# Patient Record
(demographics unavailable — no encounter records)

---

## 2024-10-15 NOTE — HEALTH RISK ASSESSMENT
[Very Good] : ~his/her~  mood as very good [Yes] : Yes [Monthly or less (1 pt)] : Monthly or less (1 point) [1 or 2 (0 pts)] : 1 or 2 (0 points) [Never (0 pts)] : Never (0 points) [No] : In the past 12 months have you used drugs other than those required for medical reasons? No [No falls in past year] : Patient reported no falls in the past year [Little interest or pleasure doing things] : 1) Little interest or pleasure doing things [Feeling down, depressed, or hopeless] : 2) Feeling down, depressed, or hopeless [0] : 2) Feeling down, depressed, or hopeless: Not at all (0) [PHQ-2 Negative - No further assessment needed] : PHQ-2 Negative - No further assessment needed [Never] : Never [NO] : No [None] : None [With Family] : lives with family [# of Members in Household ___] :  household currently consist of [unfilled] member(s) [Employed] : employed [Graduate School] : graduate school [] :  [# Of Children ___] : has [unfilled] children [Feels Safe at Home] : Feels safe at home [Fully functional (bathing, dressing, toileting, transferring, walking, feeding)] : Fully functional (bathing, dressing, toileting, transferring, walking, feeding) [Fully functional (using the telephone, shopping, preparing meals, housekeeping, doing laundry, using] : Fully functional and needs no help or supervision to perform IADLs (using the telephone, shopping, preparing meals, housekeeping, doing laundry, using transportation, managing medications and managing finances) [Smoke Detector] : smoke detector [Carbon Monoxide Detector] : carbon monoxide detector [Seat Belt] :  uses seat belt [With Patient/Caregiver] : , with patient/caregiver [Relationship: ___] : Relationship: [unfilled] [Audit-CScore] : 1 [de-identified] : plays pickleball 2x per week. walks a little, otherwise very busy with work, [[id number]] : 0 [EyeExamDate] : 08/24 [Change in mental status noted] : No change in mental status noted [Reports changes in hearing] : Reports no changes in hearing [Reports changes in vision] : Reports no changes in vision [Reports changes in dental health] : Reports no changes in dental health [MammogramDate] : 12/23 [MammogramComments] : US breast - 12/2023 [PapSmearDate] : 10/23 [BoneDensityDate] : 09/21 [ColonoscopyDate] : 07/24 [ColonoscopyComments] : Next due in 7/2029 [de-identified] : dentist - 6/2024, every 4 months [AdvancecareDate] : 10/24

## 2024-10-15 NOTE — ASSESSMENT
[FreeTextEntry1] : Patient was up-to-date with all HCM tests and will f/u with GYN for repeat Pap smear.  She was reminded to have routine eye exam, dental care and skin exam with dermatologist.

## 2024-10-15 NOTE — REVIEW OF SYSTEMS
[Nocturia] : nocturia [Joint Pain] : joint pain [Negative] : Heme/Lymph [Fever] : no fever [Chills] : no chills [Fatigue] : no fatigue [Recent Change In Weight] : ~T no recent weight change [Chest Pain] : no chest pain [Palpitations] : no palpitations [Lower Ext Edema] : no lower extremity edema [Shortness Of Breath] : no shortness of breath [Wheezing] : no wheezing [Cough] : no cough [Dyspnea on Exertion] : no dyspnea on exertion [Abdominal Pain] : no abdominal pain [Nausea] : no nausea [Constipation] : no constipation [Diarrhea] : diarrhea [Vomiting] : no vomiting [Heartburn] : no heartburn [Melena] : no melena [Dysuria] : no dysuria [Incontinence] : no incontinence [Hematuria] : no hematuria [Joint Stiffness] : no joint stiffness [Joint Swelling] : no joint swelling [Muscle Pain] : no muscle pain [Itching] : no itching [Mole Changes] : no mole changes [Skin Rash] : no skin rash [Headache] : no headache [Dizziness] : no dizziness [Fainting] : no fainting [Unsteady Walking] : no ataxia [Insomnia] : no insomnia [Anxiety] : no anxiety [Depression] : no depression [Easy Bleeding] : no easy bleeding [Easy Bruising] : no easy bruising [Swollen Glands] : no swollen glands [FreeTextEntry3] : wears corrective lens,  [FreeTextEntry7] : 1 BM every 1-2 day, [FreeTextEntry8] : Nocturia of 1 X per night,  [FreeTextEntry9] : As in HPI,

## 2024-10-15 NOTE — HISTORY OF PRESENT ILLNESS
[FreeTextEntry1] : Pt presented for PE.  Last PE was 1 year ago. [de-identified] : Her health was uneventful since last visit, she has no new complaint.  Pt went to see ortho for R shoulder pain.  Pt had Xray, and told she has OA and started on PT.  She was also given Rx for Meloxicam and she took it only once.  She also has numbness on LUE and told it may be due to her neck, and she's also getting PT for the neck.  She will f/u with ortho next month.  The  L hip pain was not worse and so did not f/u.  She is doing some stretching.  Pt had COVID infection in 2/2021, it was mild.  She had 3 doses of COVID vaccine.  She would like to have flu vaccine today.

## 2024-10-15 NOTE — HEALTH RISK ASSESSMENT
[Very Good] : ~his/her~  mood as very good [Yes] : Yes [Monthly or less (1 pt)] : Monthly or less (1 point) [1 or 2 (0 pts)] : 1 or 2 (0 points) [Never (0 pts)] : Never (0 points) [No] : In the past 12 months have you used drugs other than those required for medical reasons? No [No falls in past year] : Patient reported no falls in the past year [Little interest or pleasure doing things] : 1) Little interest or pleasure doing things [Feeling down, depressed, or hopeless] : 2) Feeling down, depressed, or hopeless [0] : 2) Feeling down, depressed, or hopeless: Not at all (0) [PHQ-2 Negative - No further assessment needed] : PHQ-2 Negative - No further assessment needed [Never] : Never [NO] : No [None] : None [With Family] : lives with family [# of Members in Household ___] :  household currently consist of [unfilled] member(s) [Employed] : employed [Graduate School] : graduate school [] :  [# Of Children ___] : has [unfilled] children [Feels Safe at Home] : Feels safe at home [Fully functional (bathing, dressing, toileting, transferring, walking, feeding)] : Fully functional (bathing, dressing, toileting, transferring, walking, feeding) [Fully functional (using the telephone, shopping, preparing meals, housekeeping, doing laundry, using] : Fully functional and needs no help or supervision to perform IADLs (using the telephone, shopping, preparing meals, housekeeping, doing laundry, using transportation, managing medications and managing finances) [Smoke Detector] : smoke detector [Carbon Monoxide Detector] : carbon monoxide detector [Seat Belt] :  uses seat belt [With Patient/Caregiver] : , with patient/caregiver [Relationship: ___] : Relationship: [unfilled] [Audit-CScore] : 1 [de-identified] : plays pickleball 2x per week. walks a little, otherwise very busy with work, [[id number]] : 0 [EyeExamDate] : 08/24 [Change in mental status noted] : No change in mental status noted [Reports changes in hearing] : Reports no changes in hearing [Reports changes in vision] : Reports no changes in vision [Reports changes in dental health] : Reports no changes in dental health [MammogramDate] : 12/23 [MammogramComments] : US breast - 12/2023 [PapSmearDate] : 10/23 [BoneDensityDate] : 09/21 [ColonoscopyDate] : 07/24 [ColonoscopyComments] : Next due in 7/2029 [de-identified] : dentist - 6/2024, every 4 months [AdvancecareDate] : 10/24

## 2024-10-15 NOTE — PHYSICAL EXAM
[No Acute Distress] : no acute distress [Well Nourished] : well nourished [Well Developed] : well developed [Normal Sclera/Conjunctiva] : normal sclera/conjunctiva [PERRL] : pupils equal round and reactive to light [EOMI] : extraocular movements intact [Normal Outer Ear/Nose] : the outer ears and nose were normal in appearance [Normal Oropharynx] : the oropharynx was normal [Normal TMs] : both tympanic membranes were normal [Normal Nasal Mucosa] : the nasal mucosa was normal [No JVD] : no jugular venous distention [No Lymphadenopathy] : no lymphadenopathy [Supple] : supple [No Respiratory Distress] : no respiratory distress  [Clear to Auscultation] : lungs were clear to auscultation bilaterally [Normal Rate] : normal rate  [Regular Rhythm] : with a regular rhythm [Normal S1, S2] : normal S1 and S2 [No Carotid Bruits] : no carotid bruits [Pedal Pulses Present] : the pedal pulses are present [No Edema] : there was no peripheral edema [No Extremity Clubbing/Cyanosis] : no extremity clubbing/cyanosis [Normal Appearance] : normal in appearance [No Masses] : no palpable masses [No Nipple Discharge] : no nipple discharge [No Axillary Lymphadenopathy] : no axillary lymphadenopathy [Soft] : abdomen soft [Non Tender] : non-tender [Non-distended] : non-distended [Normal Bowel Sounds] : normal bowel sounds [Normal Sphincter Tone] : normal sphincter tone [No Mass] : no mass [Normal Supraclavicular Nodes] : no supraclavicular lymphadenopathy [Normal Axillary Nodes] : no axillary lymphadenopathy [Normal Posterior Cervical Nodes] : no posterior cervical lymphadenopathy [Normal Anterior Cervical Nodes] : no anterior cervical lymphadenopathy [No CVA Tenderness] : no CVA  tenderness [No Spinal Tenderness] : no spinal tenderness [No Joint Swelling] : no joint swelling [Grossly Normal Strength/Tone] : grossly normal strength/tone [No Rash] : no rash [Coordination Grossly Intact] : coordination grossly intact [No Focal Deficits] : no focal deficits [Normal Gait] : normal gait [Speech Grossly Normal] : speech grossly normal [Normal Affect] : the affect was normal [Alert and Oriented x3] : oriented to person, place, and time [Normal Mood] : the mood was normal [Stool Occult Blood] : stool negative for occult blood [Declined Rectal Exam] : declined rectal exam [de-identified] : female in stated age,  [FreeTextEntry1] : deferred, pt had colonosocpy 3 months ago, [de-identified] : Mild L hip pain with abduction,

## 2024-10-15 NOTE — HISTORY OF PRESENT ILLNESS
[FreeTextEntry1] : Pt presented for PE.  Last PE was 1 year ago. [de-identified] : Her health was uneventful since last visit, she has no new complaint.  Pt went to see ortho for R shoulder pain.  Pt had Xray, and told she has OA and started on PT.  She was also given Rx for Meloxicam and she took it only once.  She also has numbness on LUE and told it may be due to her neck, and she's also getting PT for the neck.  She will f/u with ortho next month.  The  L hip pain was not worse and so did not f/u.  She is doing some stretching.  Pt had COVID infection in 2/2021, it was mild.  She had 3 doses of COVID vaccine.  She would like to have flu vaccine today.

## 2025-05-28 NOTE — PHYSICAL EXAM
[No Acute Distress] : no acute distress [Well Nourished] : well nourished [Well Developed] : well developed [Supple] : supple [No Respiratory Distress] : no respiratory distress  [Clear to Auscultation] : lungs were clear to auscultation bilaterally [Normal Rate] : normal rate  [Regular Rhythm] : with a regular rhythm [Normal S1, S2] : normal S1 and S2 [No Edema] : there was no peripheral edema [Soft] : abdomen soft [Non Tender] : non-tender [Normal Bowel Sounds] : normal bowel sounds [No CVA Tenderness] : no CVA  tenderness [No Spinal Tenderness] : no spinal tenderness [No Joint Swelling] : no joint swelling [Grossly Normal Strength/Tone] : grossly normal strength/tone [Normal Gait] : normal gait [Speech Grossly Normal] : speech grossly normal [Normal Affect] : the affect was normal [Alert and Oriented x3] : oriented to person, place, and time [de-identified] : female in stated age,

## 2025-05-28 NOTE — HISTORY OF PRESENT ILLNESS
[FreeTextEntry1] : Pt presented for 6 month f/u of BP, HLD and leukopenia. [de-identified] : Pt noticed that she still has elevated BP over the last few months.  BP was usually 150-160/80-90s.  She's no HA, dizziness, CP, SOB or ankle swelling.  She has occ palpitations for seconds, a few times per week. She has 2 coffee in the morning, occ 1 more in the afternoon.  She's a little tired.   She saw ENT for nasal congestion, she started using a humidifier and saline gel  She was better.  She has snoring and feeling tired, so ENT ordered home sleep study and found moderate KACEY.  She's waiting for equipment to send to the house to start treatment.   She's UTD with all HCM tests/

## 2025-05-28 NOTE — PHYSICAL EXAM
[No Acute Distress] : no acute distress [Well Nourished] : well nourished [Well Developed] : well developed [Supple] : supple [No Respiratory Distress] : no respiratory distress  [Clear to Auscultation] : lungs were clear to auscultation bilaterally [Normal Rate] : normal rate  [Regular Rhythm] : with a regular rhythm [Normal S1, S2] : normal S1 and S2 [No Edema] : there was no peripheral edema [Soft] : abdomen soft [Non Tender] : non-tender [Normal Bowel Sounds] : normal bowel sounds [No CVA Tenderness] : no CVA  tenderness [No Spinal Tenderness] : no spinal tenderness [No Joint Swelling] : no joint swelling [Grossly Normal Strength/Tone] : grossly normal strength/tone [Normal Gait] : normal gait [Speech Grossly Normal] : speech grossly normal [Normal Affect] : the affect was normal [Alert and Oriented x3] : oriented to person, place, and time [de-identified] : female in stated age,

## 2025-05-28 NOTE — HISTORY OF PRESENT ILLNESS
[FreeTextEntry1] : Pt presented for 6 month f/u of BP, HLD and leukopenia. [de-identified] : Pt noticed that she still has elevated BP over the last few months.  BP was usually 150-160/80-90s.  She's no HA, dizziness, CP, SOB or ankle swelling.  She has occ palpitations for seconds, a few times per week. She has 2 coffee in the morning, occ 1 more in the afternoon.  She's a little tired.   She saw ENT for nasal congestion, she started using a humidifier and saline gel  She was better.  She has snoring and feeling tired, so ENT ordered home sleep study and found moderate KACEY.  She's waiting for equipment to send to the house to start treatment.   She's UTD with all HCM tests/

## 2025-06-30 NOTE — PHYSICAL EXAM
[No Acute Distress] : no acute distress [Well Nourished] : well nourished [Well Developed] : well developed [Supple] : supple [No Respiratory Distress] : no respiratory distress  [Clear to Auscultation] : lungs were clear to auscultation bilaterally [Normal Rate] : normal rate  [Regular Rhythm] : with a regular rhythm [Normal S1, S2] : normal S1 and S2 [No Edema] : there was no peripheral edema [Soft] : abdomen soft [Non Tender] : non-tender [Normal Bowel Sounds] : normal bowel sounds [No CVA Tenderness] : no CVA  tenderness [No Spinal Tenderness] : no spinal tenderness [Normal Gait] : normal gait [Speech Grossly Normal] : speech grossly normal [Normal Affect] : the affect was normal [Alert and Oriented x3] : oriented to person, place, and time [de-identified] : female in stated age,

## 2025-06-30 NOTE — HISTORY OF PRESENT ILLNESS
[FreeTextEntry1] : 64 y/o woman presented for BP check. [de-identified] : Pt was started on Amlodipine 5 mg 1 month ago.  She tolerated meds well.  She did have one day of elevated BP when she was in a lot of stress that day.  BP was better that day.  She denied any SE.  She c/o palpitations over the past year.  She can have it a few times per week.  It can lasts a couple of minutes.  She thinks she may be out of breath.  She thinks she may be due to anxiety as it can occur with any situation.  It resolves spontaneously.    She denied any new complaint and has not been sick lately.

## 2025-07-22 NOTE — DISCUSSION/SUMMARY
[FreeTextEntry1] : Lower ext venous duplex to rule out DVT Echocardiogram to eval LV function Recommended CCTA to eval for obstructive CAD, discussed indications and alternative of stress testing. She is agreeable. Contin BP med.  Follow up based on above.  [EKG obtained to assist in diagnosis and management of assessed problem(s)] : EKG obtained to assist in diagnosis and management of assessed problem(s)

## 2025-07-22 NOTE — HISTORY OF PRESENT ILLNESS
[FreeTextEntry1] : SAMANTHA OGDEN is a 63 year old female referred for consultation due to few months history of dyspnea on exertion with exercise or going up stairs. This is associated with vague mid SS chest sensation.  Was started on BP med earlier this year.  No symptoms at rest. No prior cardiac history. Labs reviewed. She complains of intermittent left lateral leg pain.  She has left hip pain.  No swelling.  No paraesthesias.  Fam hist - Brother - CAD/CABG in his 40s. Soc - never smoked.